# Patient Record
Sex: FEMALE | Race: OTHER | HISPANIC OR LATINO | Employment: FULL TIME | URBAN - METROPOLITAN AREA
[De-identification: names, ages, dates, MRNs, and addresses within clinical notes are randomized per-mention and may not be internally consistent; named-entity substitution may affect disease eponyms.]

---

## 2017-06-19 ENCOUNTER — ALLSCRIPTS OFFICE VISIT (OUTPATIENT)
Dept: OTHER | Facility: OTHER | Age: 27
End: 2017-06-19

## 2017-06-19 DIAGNOSIS — Z87.42 PERSONAL HISTORY OF OTHER DISEASES OF THE FEMALE GENITAL TRACT: ICD-10-CM

## 2017-06-19 DIAGNOSIS — N97.0 FEMALE INFERTILITY ASSOCIATED WITH ANOVULATION: ICD-10-CM

## 2017-06-19 DIAGNOSIS — N94.6 DYSMENORRHEA: ICD-10-CM

## 2017-06-26 ENCOUNTER — LAB CONVERSION - ENCOUNTER (OUTPATIENT)
Dept: OTHER | Facility: OTHER | Age: 27
End: 2017-06-26

## 2017-06-26 LAB
ADDITIONAL INFORMATION (HISTORICAL): NORMAL
ADEQUACY: (HISTORICAL): NORMAL
CLINICAL COMMENT (HISTORICAL): NORMAL
CYTOTECHNOLOGIST: (HISTORICAL): NORMAL
INFECTION (HISTORICAL): NORMAL
INTERPRETATION (HISTORICAL): NORMAL
LMP (HISTORICAL): NORMAL
PREV. BX: (HISTORICAL): NORMAL
PREV. PAP (HISTORICAL): NORMAL
SOURCE (HISTORICAL): NORMAL

## 2017-06-30 ENCOUNTER — HOSPITAL ENCOUNTER (OUTPATIENT)
Dept: ULTRASOUND IMAGING | Facility: HOSPITAL | Age: 27
Discharge: HOME | End: 2017-06-30
Attending: OBSTETRICS & GYNECOLOGY
Payer: COMMERCIAL

## 2017-06-30 DIAGNOSIS — N94.6 DYSMENORRHEA: ICD-10-CM

## 2017-06-30 PROCEDURE — 76830 TRANSVAGINAL US NON-OB: CPT

## 2017-06-30 PROCEDURE — 76856 US EXAM PELVIC COMPLETE: CPT

## 2017-07-11 ENCOUNTER — ALLSCRIPTS OFFICE VISIT (OUTPATIENT)
Dept: OTHER | Facility: OTHER | Age: 27
End: 2017-07-11

## 2017-07-11 ENCOUNTER — APPOINTMENT (OUTPATIENT)
Dept: LAB | Facility: HOSPITAL | Age: 27
End: 2017-07-11
Attending: OBSTETRICS & GYNECOLOGY
Payer: COMMERCIAL

## 2017-07-11 DIAGNOSIS — N97.0 FEMALE INFERTILITY ASSOCIATED WITH ANOVULATION: ICD-10-CM

## 2017-07-11 LAB — PROGEST SERPL-MCNC: 1.3 NG/ML

## 2017-07-11 PROCEDURE — 36415 COLL VENOUS BLD VENIPUNCTURE: CPT

## 2017-07-11 PROCEDURE — 84144 ASSAY OF PROGESTERONE: CPT

## 2017-07-26 ENCOUNTER — HOSPITAL ENCOUNTER (EMERGENCY)
Facility: HOSPITAL | Age: 27
Discharge: HOME | End: 2017-07-26
Attending: EMERGENCY MEDICINE
Payer: COMMERCIAL

## 2017-07-26 VITALS
HEART RATE: 61 BPM | TEMPERATURE: 98.7 F | OXYGEN SATURATION: 91 % | DIASTOLIC BLOOD PRESSURE: 85 MMHG | SYSTOLIC BLOOD PRESSURE: 121 MMHG | RESPIRATION RATE: 16 BRPM | WEIGHT: 151.01 LBS

## 2017-07-26 DIAGNOSIS — T78.40XA ALLERGIC REACTION, INITIAL ENCOUNTER: Primary | ICD-10-CM

## 2017-07-26 PROCEDURE — 99283 EMERGENCY DEPT VISIT LOW MDM: CPT

## 2017-07-26 RX ORDER — ALBUTEROL SULFATE 90 UG/1
2 AEROSOL, METERED RESPIRATORY (INHALATION) EVERY 4 HOURS PRN
COMMUNITY

## 2017-07-26 RX ORDER — PREDNISONE 20 MG/1
40 TABLET ORAL DAILY
Qty: 6 TABLET | Refills: 0 | Status: SHIPPED | OUTPATIENT
Start: 2017-07-26 | End: 2017-07-29

## 2017-07-26 RX ORDER — DIPHENHYDRAMINE HCL 25 MG
25 TABLET ORAL ONCE
Status: COMPLETED | OUTPATIENT
Start: 2017-07-26 | End: 2017-07-26

## 2017-07-26 RX ORDER — DIPHENHYDRAMINE HCL 25 MG
25 CAPSULE ORAL EVERY 6 HOURS PRN
Qty: 20 CAPSULE | Refills: 0 | Status: SHIPPED | OUTPATIENT
Start: 2017-07-26 | End: 2017-10-26

## 2017-07-26 RX ADMIN — DIPHENHYDRAMINE HCL 25 MG: 25 TABLET ORAL at 15:21

## 2017-07-26 RX ADMIN — DEXAMETHASONE SODIUM PHOSPHATE 10 MG: 10 INJECTION, SOLUTION INTRAMUSCULAR; INTRAVENOUS at 15:21

## 2017-08-14 ENCOUNTER — ALLSCRIPTS OFFICE VISIT (OUTPATIENT)
Dept: OTHER | Facility: OTHER | Age: 27
End: 2017-08-14

## 2017-08-14 DIAGNOSIS — N97.0 FEMALE INFERTILITY ASSOCIATED WITH ANOVULATION: ICD-10-CM

## 2017-10-04 ENCOUNTER — GENERIC CONVERSION - ENCOUNTER (OUTPATIENT)
Dept: OTHER | Facility: OTHER | Age: 27
End: 2017-10-04

## 2017-10-04 ENCOUNTER — APPOINTMENT (OUTPATIENT)
Dept: LAB | Facility: HOSPITAL | Age: 27
End: 2017-10-04
Attending: OBSTETRICS & GYNECOLOGY
Payer: COMMERCIAL

## 2017-10-04 DIAGNOSIS — N91.2 AMENORRHEA: ICD-10-CM

## 2017-10-04 DIAGNOSIS — R79.89 OTHER SPECIFIED ABNORMAL FINDINGS OF BLOOD CHEMISTRY: ICD-10-CM

## 2017-10-04 DIAGNOSIS — O20.0 THREATENED ABORTION: ICD-10-CM

## 2017-10-04 LAB
B-HCG SERPL-ACNC: 337 MIU/ML
PROGEST SERPL-MCNC: 11.7 NG/ML
TSH SERPL DL<=0.05 MIU/L-ACNC: 4.08 UIU/ML (ref 0.36–3.74)

## 2017-10-04 PROCEDURE — 36415 COLL VENOUS BLD VENIPUNCTURE: CPT

## 2017-10-04 PROCEDURE — 84443 ASSAY THYROID STIM HORMONE: CPT

## 2017-10-04 PROCEDURE — 84144 ASSAY OF PROGESTERONE: CPT

## 2017-10-04 PROCEDURE — 84702 CHORIONIC GONADOTROPIN TEST: CPT

## 2017-10-11 ENCOUNTER — APPOINTMENT (OUTPATIENT)
Dept: LAB | Facility: HOSPITAL | Age: 27
End: 2017-10-11
Attending: OBSTETRICS & GYNECOLOGY
Payer: COMMERCIAL

## 2017-10-11 ENCOUNTER — GENERIC CONVERSION - ENCOUNTER (OUTPATIENT)
Dept: OTHER | Facility: OTHER | Age: 27
End: 2017-10-11

## 2017-10-11 DIAGNOSIS — R79.89 OTHER SPECIFIED ABNORMAL FINDINGS OF BLOOD CHEMISTRY: ICD-10-CM

## 2017-10-11 DIAGNOSIS — N91.2 AMENORRHEA: ICD-10-CM

## 2017-10-11 LAB
B-HCG SERPL-ACNC: 710.1 MIU/ML
PROGEST SERPL-MCNC: 16.8 NG/ML

## 2017-10-11 PROCEDURE — 84702 CHORIONIC GONADOTROPIN TEST: CPT

## 2017-10-11 PROCEDURE — 84144 ASSAY OF PROGESTERONE: CPT

## 2017-10-11 PROCEDURE — 36415 COLL VENOUS BLD VENIPUNCTURE: CPT

## 2017-10-12 ENCOUNTER — HOSPITAL ENCOUNTER (OUTPATIENT)
Dept: ULTRASOUND IMAGING | Facility: HOSPITAL | Age: 27
Discharge: HOME | End: 2017-10-12
Attending: OBSTETRICS & GYNECOLOGY
Payer: COMMERCIAL

## 2017-10-12 ENCOUNTER — TRANSCRIBE ORDERS (OUTPATIENT)
Dept: ADMINISTRATIVE | Facility: HOSPITAL | Age: 27
End: 2017-10-12

## 2017-10-12 DIAGNOSIS — O00.90 ECTOPIC PREGNANCY, UNSPECIFIED LOCATION, UNSPECIFIED WHETHER INTRAUTERINE PREGNANCY PRESENT: ICD-10-CM

## 2017-10-12 DIAGNOSIS — O00.90 ECTOPIC PREGNANCY, UNSPECIFIED LOCATION, UNSPECIFIED WHETHER INTRAUTERINE PREGNANCY PRESENT: Primary | ICD-10-CM

## 2017-10-12 PROCEDURE — 76801 OB US < 14 WKS SINGLE FETUS: CPT

## 2017-10-16 ENCOUNTER — APPOINTMENT (OUTPATIENT)
Dept: LAB | Facility: HOSPITAL | Age: 27
End: 2017-10-16
Attending: OBSTETRICS & GYNECOLOGY
Payer: COMMERCIAL

## 2017-10-16 DIAGNOSIS — O20.0 THREATENED ABORTION: ICD-10-CM

## 2017-10-16 LAB
B-HCG SERPL-ACNC: 1098.4 MIU/ML
PROGEST SERPL-MCNC: 13.1 NG/ML

## 2017-10-16 PROCEDURE — 36415 COLL VENOUS BLD VENIPUNCTURE: CPT

## 2017-10-16 PROCEDURE — 84144 ASSAY OF PROGESTERONE: CPT

## 2017-10-16 PROCEDURE — 84702 CHORIONIC GONADOTROPIN TEST: CPT

## 2017-10-21 ENCOUNTER — HOSPITAL ENCOUNTER (OUTPATIENT)
Dept: ULTRASOUND IMAGING | Facility: HOSPITAL | Age: 27
Discharge: HOME | End: 2017-10-21
Attending: OBSTETRICS & GYNECOLOGY
Payer: COMMERCIAL

## 2017-10-21 DIAGNOSIS — N91.2 AMENORRHEA: ICD-10-CM

## 2017-10-21 PROCEDURE — 76816 OB US FOLLOW-UP PER FETUS: CPT

## 2017-10-24 ENCOUNTER — ALLSCRIPTS OFFICE VISIT (OUTPATIENT)
Dept: OTHER | Facility: OTHER | Age: 27
End: 2017-10-24

## 2017-10-26 ENCOUNTER — APPOINTMENT (EMERGENCY)
Dept: ULTRASOUND IMAGING | Facility: HOSPITAL | Age: 27
End: 2017-10-26
Payer: COMMERCIAL

## 2017-10-26 ENCOUNTER — HOSPITAL ENCOUNTER (EMERGENCY)
Facility: HOSPITAL | Age: 27
Discharge: HOME | End: 2017-10-26
Admitting: EMERGENCY MEDICINE
Payer: COMMERCIAL

## 2017-10-26 VITALS
DIASTOLIC BLOOD PRESSURE: 89 MMHG | SYSTOLIC BLOOD PRESSURE: 139 MMHG | WEIGHT: 151.9 LBS | TEMPERATURE: 97.9 F | RESPIRATION RATE: 16 BRPM | OXYGEN SATURATION: 98 % | HEART RATE: 111 BPM

## 2017-10-26 DIAGNOSIS — O02.89 NONVIABLE PREGNANCY: Primary | ICD-10-CM

## 2017-10-26 LAB
ABO GROUP BLD: NORMAL
ANION GAP SERPL CALCULATED.3IONS-SCNC: 8 MMOL/L (ref 4–13)
B-HCG SERPL-ACNC: 1439.3 MIU/ML
BACTERIA UR QL AUTO: ABNORMAL /HPF
BASOPHILS # BLD AUTO: 0.04 THOUSANDS/ΜL (ref 0–0.1)
BASOPHILS NFR BLD AUTO: 1 % (ref 0–1)
BILIRUB UR QL STRIP: NEGATIVE
BLD GP AB SCN SERPL QL: NEGATIVE
BUN SERPL-MCNC: 15 MG/DL (ref 5–25)
CALCIUM SERPL-MCNC: 9.4 MG/DL (ref 8.3–10.1)
CHLORIDE SERPL-SCNC: 104 MMOL/L (ref 100–108)
CLARITY UR: ABNORMAL
CO2 SERPL-SCNC: 27 MMOL/L (ref 21–32)
COLOR UR: YELLOW
CREAT SERPL-MCNC: 0.77 MG/DL (ref 0.6–1.3)
EOSINOPHIL # BLD AUTO: 0.1 THOUSAND/ΜL (ref 0–0.61)
EOSINOPHIL NFR BLD AUTO: 1 % (ref 0–6)
ERYTHROCYTE [DISTWIDTH] IN BLOOD BY AUTOMATED COUNT: 12.9 % (ref 11.6–15.1)
EXT PREG TEST URINE: NORMAL
GFR SERPL CREATININE-BSD FRML MDRD: 108 ML/MIN/1.73SQ M
GLUCOSE SERPL-MCNC: 91 MG/DL (ref 65–140)
GLUCOSE UR STRIP-MCNC: NEGATIVE MG/DL
HCT VFR BLD AUTO: 36.4 % (ref 34.8–46.1)
HGB BLD-MCNC: 12.7 G/DL (ref 11.5–15.4)
HGB UR QL STRIP.AUTO: ABNORMAL
KETONES UR STRIP-MCNC: NEGATIVE MG/DL
LEUKOCYTE ESTERASE UR QL STRIP: NEGATIVE
LYMPHOCYTES # BLD AUTO: 2.28 THOUSANDS/ΜL (ref 0.6–4.47)
LYMPHOCYTES NFR BLD AUTO: 29 % (ref 14–44)
MCH RBC QN AUTO: 31.7 PG (ref 26.8–34.3)
MCHC RBC AUTO-ENTMCNC: 34.9 G/DL (ref 31.4–37.4)
MCV RBC AUTO: 91 FL (ref 82–98)
MONOCYTES # BLD AUTO: 0.76 THOUSAND/ΜL (ref 0.17–1.22)
MONOCYTES NFR BLD AUTO: 10 % (ref 4–12)
NEUTROPHILS # BLD AUTO: 4.81 THOUSANDS/ΜL (ref 1.85–7.62)
NEUTS SEG NFR BLD AUTO: 59 % (ref 43–75)
NITRITE UR QL STRIP: NEGATIVE
NON-SQ EPI CELLS URNS QL MICRO: ABNORMAL /HPF
NRBC BLD AUTO-RTO: 0 /100 WBCS
PH UR STRIP.AUTO: 6 [PH] (ref 4.5–8)
PLATELET # BLD AUTO: 259 THOUSANDS/UL (ref 149–390)
PMV BLD AUTO: 9 FL (ref 8.9–12.7)
POTASSIUM SERPL-SCNC: 3.8 MMOL/L (ref 3.5–5.3)
PROGEST SERPL-MCNC: 13.9 NG/ML
PROT UR STRIP-MCNC: NEGATIVE MG/DL
RBC # BLD AUTO: 4.01 MILLION/UL (ref 3.81–5.12)
RBC #/AREA URNS AUTO: ABNORMAL /HPF
RH BLD: POSITIVE
SODIUM SERPL-SCNC: 139 MMOL/L (ref 136–145)
SP GR UR STRIP.AUTO: 1.02 (ref 1–1.03)
SPECIMEN EXPIRATION DATE: NORMAL
UROBILINOGEN UR QL STRIP.AUTO: 0.2 E.U./DL
WBC # BLD AUTO: 7.99 THOUSAND/UL (ref 4.31–10.16)
WBC #/AREA URNS AUTO: ABNORMAL /HPF

## 2017-10-26 PROCEDURE — 84702 CHORIONIC GONADOTROPIN TEST: CPT | Performed by: PHYSICIAN ASSISTANT

## 2017-10-26 PROCEDURE — 76816 OB US FOLLOW-UP PER FETUS: CPT

## 2017-10-26 PROCEDURE — 81002 URINALYSIS NONAUTO W/O SCOPE: CPT

## 2017-10-26 PROCEDURE — 86901 BLOOD TYPING SEROLOGIC RH(D): CPT | Performed by: PHYSICIAN ASSISTANT

## 2017-10-26 PROCEDURE — 86850 RBC ANTIBODY SCREEN: CPT | Performed by: PHYSICIAN ASSISTANT

## 2017-10-26 PROCEDURE — 86900 BLOOD TYPING SEROLOGIC ABO: CPT | Performed by: PHYSICIAN ASSISTANT

## 2017-10-26 PROCEDURE — 36415 COLL VENOUS BLD VENIPUNCTURE: CPT | Performed by: PHYSICIAN ASSISTANT

## 2017-10-26 PROCEDURE — 81025 URINE PREGNANCY TEST: CPT

## 2017-10-26 PROCEDURE — 85025 COMPLETE CBC W/AUTO DIFF WBC: CPT | Performed by: PHYSICIAN ASSISTANT

## 2017-10-26 PROCEDURE — 80048 BASIC METABOLIC PNL TOTAL CA: CPT | Performed by: PHYSICIAN ASSISTANT

## 2017-10-26 PROCEDURE — 76817 TRANSVAGINAL US OBSTETRIC: CPT

## 2017-10-26 PROCEDURE — 84144 ASSAY OF PROGESTERONE: CPT | Performed by: PHYSICIAN ASSISTANT

## 2017-10-26 PROCEDURE — 81001 URINALYSIS AUTO W/SCOPE: CPT

## 2017-10-26 PROCEDURE — 99284 EMERGENCY DEPT VISIT MOD MDM: CPT

## 2017-10-26 RX ORDER — LEVOTHYROXINE SODIUM 0.03 MG/1
25 TABLET ORAL DAILY
COMMUNITY
End: 2017-10-27 | Stop reason: HOSPADM

## 2017-10-26 NOTE — ED NOTES
Pt reports is approximately 6 weeks pregnant,   Pt reports last night developed lower back pain  Pt reports this morning went to the bathroom and noticed she had passed blood clots  Pt reports no active bleeding since then   Pt reports lower abdominal pain      Timoteo Veliz RN  10/26/17 1130

## 2017-10-26 NOTE — ED PROVIDER NOTES
History  Chief Complaint   Patient presents with    Vaginal Bleeding - Pregnant     c/o lower back pain and passing clots starting today  pt  reports she is approx  6 weeks pregnant  Denies abdominal pain  24year old female 6 weeks pregnant presents today shortly after passing 3 dime-sized blood clots in the toilet this morning  Has been following up for weekly HCGs as her numbers were not rising appropriately, and last US one week ago showed an IUP of uncertain viability  She was advised that she is likely to miscarry by Dr Alma Delia Briseno, her OB  She denies any abdominal or pelvic pain  No urinary symptoms  No active bleeding, just one episode earlier today  Pt is tearful and reports that she had been taking Clomid to get pregnant for the past few months  This is her first pregnancy  Prior to Admission Medications   Prescriptions Last Dose Informant Patient Reported? Taking? albuterol (PROVENTIL HFA,VENTOLIN HFA) 90 mcg/act inhaler   Yes Yes   Sig: Inhale 2 puffs every 4 (four) hours as needed      Facility-Administered Medications: None       Past Medical History:   Diagnosis Date    Asthma     Fibroids        Past Surgical History:   Procedure Laterality Date    DILATION AND EVACUATION N/A 10/27/2017    Procedure: DILATATION AND EVACUATION (D&E); Surgeon: Alicia Dugan MD;  Location: [facility] OR;  Service: Gynecology       History reviewed  No pertinent family history  I have reviewed and agree with the history as documented  Social History   Substance Use Topics    Smoking status: Former Smoker    Smokeless tobacco: Never Used    Alcohol use No        Review of Systems   Genitourinary: Positive for vaginal bleeding  All other systems reviewed and are negative        Physical Exam  ED Triage Vitals   Temperature Pulse Respirations Blood Pressure SpO2   10/26/17 1015 10/26/17 1015 10/26/17 1015 10/26/17 1015 10/26/17 1015   99 2 °F (37 3 °C) 92 18 150/93 98 %      Temp Source Heart Rate Source Patient Position - Orthostatic VS BP Location FiO2 (%)   10/26/17 1015 10/26/17 1204 10/26/17 1204 10/26/17 1204 --   Temporal Monitor Sitting Right arm       Pain Score       10/26/17 1015       No Pain           Orthostatic Vital Signs  Vitals:    10/26/17 1015 10/26/17 1204 10/26/17 1347   BP: 150/93 119/74 139/89   Pulse: 92 65 (!) 111   Patient Position - Orthostatic VS:  Sitting Sitting       Physical Exam   Constitutional: She is oriented to person, place, and time  She appears well-developed and well-nourished  No distress  HENT:   Head: Normocephalic and atraumatic  Eyes: Conjunctivae and EOM are normal  Pupils are equal, round, and reactive to light  Neck: Normal range of motion  Neck supple  Cardiovascular: Normal rate and regular rhythm  Pulmonary/Chest: Effort normal and breath sounds normal  No respiratory distress  She has no wheezes  Abdominal: Soft  Bowel sounds are normal  She exhibits no distension  There is no tenderness  Genitourinary: Vagina normal  Cervix exhibits no discharge  No bleeding in the vagina  Genitourinary Comments: Cervical os closed without any bleeding, discharge  Musculoskeletal: Normal range of motion  Neurological: She is alert and oriented to person, place, and time  Skin: Skin is warm and dry  Capillary refill takes less than 2 seconds  She is not diaphoretic  Psychiatric: She has a normal mood and affect   Her behavior is normal        ED Medications  Medications - No data to display    Diagnostic Studies  Results Reviewed     Procedure Component Value Units Date/Time    Progesterone [[id number]]  (Normal) Collected:  10/26/17 1156    Lab Status:  Final result Specimen:  Blood from Arm, Right Updated:  10/26/17 1533     Progesterone 13 90 ng/mL     hCG, quantitative [[id number]]  (Abnormal) Collected:  10/26/17 1156    Lab Status:  Final result Specimen:  Blood from Arm, Right Updated:  10/26/17 1240     HCG, Quant 1,439 3 (H) mIU/mL Narrative:          Expected Ranges:     Approximate               Approximate HCG  Gestation age          Concentration ( mIU/mL)  _____________          ______________________   Mavis Irby                      HCG values  0 2-1                       5-50  1-2                           2-3                         100-5000  3-4                         500-69244  4-5                         1000-27951  5-6                         62884-254243  6-8                         25725-209838  8-12                        61998-079958    Basic metabolic panel [[id number]] Collected:  10/26/17 1156    Lab Status:  Final result Specimen:  Blood from Arm, Right Updated:  10/26/17 1240     Sodium 139 mmol/L      Potassium 3 8 mmol/L      Chloride 104 mmol/L      CO2 27 mmol/L      Anion Gap 8 mmol/L      BUN 15 mg/dL      Creatinine 0 77 mg/dL      Glucose 91 mg/dL      Calcium 9 4 mg/dL      eGFR 108 ml/min/1 73sq m     Narrative:         National Kidney Disease Education Program recommendations are as follows:  GFR calculation is accurate only with a steady state creatinine  Chronic Kidney disease less than 60 ml/min/1 73 sq  meters  Kidney failure less than 15 ml/min/1 73 sq  meters      CBC and differential [[id number]]  (Normal) Collected:  10/26/17 1156    Lab Status:  Final result Specimen:  Blood from Arm, Right Updated:  10/26/17 1203     WBC 7 99 Thousand/uL      RBC 4 01 Million/uL      Hemoglobin 12 7 g/dL      Hematocrit 36 4 %      MCV 91 fL      MCH 31 7 pg      MCHC 34 9 g/dL      RDW 12 9 %      MPV 9 0 fL      Platelets 416 Thousands/uL      nRBC 0 /100 WBCs      Neutrophils Relative 59 %      Lymphocytes Relative 29 %      Monocytes Relative 10 %      Eosinophils Relative 1 %      Basophils Relative 1 %      Neutrophils Absolute 4 81 Thousands/µL      Lymphocytes Absolute 2 28 Thousands/µL      Monocytes Absolute 0 76 Thousand/µL      Eosinophils Absolute 0 10 Thousand/µL      Basophils Absolute 0 04 Thousands/µL Urine Microscopic [[id number]]  (Abnormal) Collected:  10/26/17 1125    Lab Status:  Final result Specimen:  Urine from Urine, Clean Catch Updated:  10/26/17 1128     RBC, UA None Seen /hpf      WBC, UA 1-2 (A) /hpf      Epithelial Cells Moderate (A) /hpf      Bacteria, UA Moderate (A) /hpf     POCT urinalysis dipstick [[id number]]  (Abnormal) Resulted:  10/26/17 1113    Lab Status:  Final result Specimen:  Urine from Urine, Other Updated:  10/26/17 1113    POCT pregnancy, urine [[id number]]  (Normal) Resulted:  10/26/17 1113    Lab Status:  Final result Updated:  10/26/17 1113     EXT PREG TEST UR (Ref: Negative) positive(+)    ED Urine Macroscopic [[id number]]  (Abnormal) Collected:  10/26/17 1125    Lab Status:  Final result Specimen:  Urine Updated:  10/26/17 1111     Color, UA Yellow     Clarity, UA Turbid     pH, UA 6 0     Leukocytes, UA Negative     Nitrite, UA Negative     Protein, UA Negative mg/dl      Glucose, UA Negative mg/dl      Ketones, UA Negative mg/dl      Urobilinogen, UA 0 2 E U /dl      Bilirubin, UA Negative     Blood, UA Trace (A)     Specific [place], UA 1 025    Narrative:       CLINITEK RESULT                 US OB transvaginal   Final Result by Claudia Beltran MD (10/27 4653)      Intrauterine gestation without heartbeat, not significantly changed in size and appearance from the prior study  Based on current consensus literature terminology, this is a nonviable intrauterine pregnancy  There is absence of embryo with heartbeat 2    weeks after a scan that showed gestational sac with a yolk sac on 10/12/2017  Reference: N Engl J Med 124;70 pp  3495 to 1451  ##imslh##imslh         Workstation performed: [id number]         US OB follow up transabdominal approach   Final Result by Claudia Beltran MD (10/26 5401)      Intrauterine gestation without heartbeat, not significantly changed in size and appearance from the prior study   Based on current consensus literature terminology, this is a nonviable intrauterine pregnancy  There is absence of embryo with heartbeat 2    weeks after a scan that showed gestational sac with a yolk sac on 10/12/2017  Reference: N Engl J Med 713;58 pp  9719 to 1451  ##imslh##imslh         Workstation performed: [id number]                    Procedures  Procedures       Phone Contacts  ED Phone Contact    ED Course  ED Course                                MDM  Number of Diagnoses or Management Options  Nonviable pregnancy:   Diagnosis management comments: Case discussed with Dr Silvana Beal who recommends US to confirm viability  When US revealed a nonviable IUP, Dr Silvana Beal was contacted and she saw the patient  The patient will follow-up with her tomorrow in the office  [facility] Time    Disposition  Final diagnoses:   Nonviable pregnancy     Time reflects when diagnosis was documented in both MDM as applicable and the Disposition within this note     Time User Action Codes Description Comment    10/26/2017  1:55 PM Lisa Cough Add [O02 89] Nonviable pregnancy       ED Disposition     ED Disposition Condition Comment    Discharge  Bethko Crisfide discharge to home/self care      Condition at discharge: Good        Follow-up Information     Follow up With Specialties Details Why Keri Alexander MD Obstetrics and Gynecology Schedule an appointment as soon as possible for a visit  [address] [place] [address],[address]  [facility] 9          Discharge Medication List as of 10/26/2017  1:57 PM      CONTINUE these medications which have NOT CHANGED    Details   albuterol (PROVENTIL HFA,VENTOLIN HFA) 90 mcg/act inhaler Inhale 2 puffs every 4 (four) hours as needed, Historical Med      levothyroxine 25 mcg tablet Take 25 mcg by mouth daily, Historical Med      Prenatal MV-Min-Fe Fum-FA-DHA (PRENATAL 1 PO) Take by mouth, Historical Med      Progesterone Micronized (PROGESTERONE PO) Take by mouth, Historical Med           No discharge procedures on file      ED Provider  Electronically Signed by           Liza Dc PA-C  11/05/17 8226

## 2017-10-26 NOTE — PROGRESS NOTES
Patient currently has no bleeding  She also denies pelvic pain  Her ultrasound results were consistent for an embryonic demise and missed   Patient is very upset  I discussed with her management options including expectant management versus medical therapy versus surgical therapy with a suction curettaged  She would like expectant management for now  She knows to call if with heavy bleeding or severe pain  For now she would like to keep her appointment with me in the office on Monday for further discussion regarding treatment options

## 2017-10-27 ENCOUNTER — HOSPITAL ENCOUNTER (OUTPATIENT)
Facility: HOSPITAL | Age: 27
Setting detail: OUTPATIENT SURGERY
Discharge: HOME | End: 2017-10-27
Attending: OBSTETRICS & GYNECOLOGY | Admitting: OBSTETRICS & GYNECOLOGY
Payer: COMMERCIAL

## 2017-10-27 ENCOUNTER — GENERIC CONVERSION - ENCOUNTER (OUTPATIENT)
Dept: OTHER | Facility: OTHER | Age: 27
End: 2017-10-27

## 2017-10-27 ENCOUNTER — ANESTHESIA (OUTPATIENT)
Dept: PERIOP | Facility: HOSPITAL | Age: 27
End: 2017-10-27
Payer: COMMERCIAL

## 2017-10-27 ENCOUNTER — ANESTHESIA EVENT (OUTPATIENT)
Dept: PERIOP | Facility: HOSPITAL | Age: 27
End: 2017-10-27
Payer: COMMERCIAL

## 2017-10-27 VITALS
RESPIRATION RATE: 16 BRPM | WEIGHT: 149 LBS | HEART RATE: 83 BPM | BODY MASS INDEX: 26.4 KG/M2 | SYSTOLIC BLOOD PRESSURE: 123 MMHG | TEMPERATURE: 97.5 F | OXYGEN SATURATION: 100 % | DIASTOLIC BLOOD PRESSURE: 60 MMHG | HEIGHT: 63 IN

## 2017-10-27 PROCEDURE — 88305 TISSUE EXAM BY PATHOLOGIST: CPT | Performed by: OBSTETRICS & GYNECOLOGY

## 2017-10-27 RX ORDER — METHYLERGONOVINE MALEATE 0.2 MG/ML
INJECTION INTRAVENOUS AS NEEDED
Status: DISCONTINUED | OUTPATIENT
Start: 2017-10-27 | End: 2017-10-27 | Stop reason: SURG

## 2017-10-27 RX ORDER — LIDOCAINE HYDROCHLORIDE 10 MG/ML
INJECTION, SOLUTION INFILTRATION; PERINEURAL AS NEEDED
Status: DISCONTINUED | OUTPATIENT
Start: 2017-10-27 | End: 2017-10-27 | Stop reason: SURG

## 2017-10-27 RX ORDER — OXYCODONE HYDROCHLORIDE AND ACETAMINOPHEN 5; 325 MG/1; MG/1
1 TABLET ORAL EVERY 4 HOURS PRN
Status: DISCONTINUED | OUTPATIENT
Start: 2017-10-27 | End: 2017-10-27 | Stop reason: HOSPADM

## 2017-10-27 RX ORDER — DOXYCYCLINE HYCLATE 100 MG/1
200 CAPSULE ORAL ONCE
Status: DISCONTINUED | OUTPATIENT
Start: 2017-10-27 | End: 2017-10-27 | Stop reason: SDUPTHER

## 2017-10-27 RX ORDER — SODIUM CHLORIDE, SODIUM LACTATE, POTASSIUM CHLORIDE, CALCIUM CHLORIDE 600; 310; 30; 20 MG/100ML; MG/100ML; MG/100ML; MG/100ML
125 INJECTION, SOLUTION INTRAVENOUS CONTINUOUS
Status: DISCONTINUED | OUTPATIENT
Start: 2017-10-27 | End: 2017-10-27 | Stop reason: HOSPADM

## 2017-10-27 RX ORDER — METOCLOPRAMIDE HYDROCHLORIDE 5 MG/ML
10 INJECTION INTRAMUSCULAR; INTRAVENOUS ONCE
Status: COMPLETED | OUTPATIENT
Start: 2017-10-27 | End: 2017-10-27

## 2017-10-27 RX ORDER — FENTANYL CITRATE 50 UG/ML
INJECTION, SOLUTION INTRAMUSCULAR; INTRAVENOUS AS NEEDED
Status: DISCONTINUED | OUTPATIENT
Start: 2017-10-27 | End: 2017-10-27 | Stop reason: SURG

## 2017-10-27 RX ORDER — MIDAZOLAM HYDROCHLORIDE 1 MG/ML
INJECTION INTRAMUSCULAR; INTRAVENOUS AS NEEDED
Status: DISCONTINUED | OUTPATIENT
Start: 2017-10-27 | End: 2017-10-27 | Stop reason: SURG

## 2017-10-27 RX ORDER — KETOROLAC TROMETHAMINE 30 MG/ML
30 INJECTION, SOLUTION INTRAMUSCULAR; INTRAVENOUS ONCE
Status: COMPLETED | OUTPATIENT
Start: 2017-10-27 | End: 2017-10-27

## 2017-10-27 RX ORDER — ONDANSETRON 2 MG/ML
4 INJECTION INTRAMUSCULAR; INTRAVENOUS ONCE AS NEEDED
Status: DISCONTINUED | OUTPATIENT
Start: 2017-10-27 | End: 2017-10-27 | Stop reason: HOSPADM

## 2017-10-27 RX ORDER — SODIUM CHLORIDE 9 MG/ML
125 INJECTION, SOLUTION INTRAVENOUS CONTINUOUS
Status: DISCONTINUED | OUTPATIENT
Start: 2017-10-27 | End: 2017-10-27 | Stop reason: HOSPADM

## 2017-10-27 RX ORDER — DOXYCYCLINE HYCLATE 100 MG/1
100 CAPSULE ORAL ONCE
Status: COMPLETED | OUTPATIENT
Start: 2017-10-27 | End: 2017-10-27

## 2017-10-27 RX ORDER — OXYCODONE HYDROCHLORIDE AND ACETAMINOPHEN 5; 325 MG/1; MG/1
2 TABLET ORAL EVERY 4 HOURS PRN
Status: DISCONTINUED | OUTPATIENT
Start: 2017-10-27 | End: 2017-10-27 | Stop reason: HOSPADM

## 2017-10-27 RX ORDER — DOXYCYCLINE HYCLATE 100 MG/1
200 CAPSULE ORAL ONCE
Status: COMPLETED | OUTPATIENT
Start: 2017-10-27 | End: 2017-10-27

## 2017-10-27 RX ORDER — ONDANSETRON 2 MG/ML
INJECTION INTRAMUSCULAR; INTRAVENOUS AS NEEDED
Status: DISCONTINUED | OUTPATIENT
Start: 2017-10-27 | End: 2017-10-27 | Stop reason: SURG

## 2017-10-27 RX ORDER — FENTANYL CITRATE/PF 50 MCG/ML
25 SYRINGE (ML) INJECTION
Status: DISCONTINUED | OUTPATIENT
Start: 2017-10-27 | End: 2017-10-27 | Stop reason: HOSPADM

## 2017-10-27 RX ORDER — IBUPROFEN 600 MG/1
600 TABLET ORAL EVERY 6 HOURS PRN
Status: DISCONTINUED | OUTPATIENT
Start: 2017-10-27 | End: 2017-10-27 | Stop reason: HOSPADM

## 2017-10-27 RX ORDER — MAGNESIUM HYDROXIDE 1200 MG/15ML
LIQUID ORAL AS NEEDED
Status: DISCONTINUED | OUTPATIENT
Start: 2017-10-27 | End: 2017-10-27 | Stop reason: HOSPADM

## 2017-10-27 RX ORDER — PROPOFOL 10 MG/ML
INJECTION, EMULSION INTRAVENOUS AS NEEDED
Status: DISCONTINUED | OUTPATIENT
Start: 2017-10-27 | End: 2017-10-27 | Stop reason: SURG

## 2017-10-27 RX ADMIN — DOXYCYCLINE 100 MG: 100 CAPSULE ORAL at 12:18

## 2017-10-27 RX ADMIN — METOCLOPRAMIDE HYDROCHLORIDE 10 MG: 5 INJECTION INTRAMUSCULAR; INTRAVENOUS at 12:25

## 2017-10-27 RX ADMIN — FENTANYL CITRATE 25 MCG: 50 INJECTION INTRAMUSCULAR; INTRAVENOUS at 14:25

## 2017-10-27 RX ADMIN — FENTANYL CITRATE 25 MCG: 50 INJECTION INTRAMUSCULAR; INTRAVENOUS at 14:18

## 2017-10-27 RX ADMIN — DEXAMETHASONE SODIUM PHOSPHATE 4 MG: 10 INJECTION INTRAMUSCULAR; INTRAVENOUS at 13:53

## 2017-10-27 RX ADMIN — SODIUM CHLORIDE 125 ML/HR: 0.9 INJECTION, SOLUTION INTRAVENOUS at 12:16

## 2017-10-27 RX ADMIN — FENTANYL CITRATE 50 MCG: 50 INJECTION, SOLUTION INTRAMUSCULAR; INTRAVENOUS at 13:38

## 2017-10-27 RX ADMIN — METHYLERGONOVINE MALEATE 0.2 MG: 0.2 INJECTION, SOLUTION INTRAMUSCULAR; INTRAVENOUS at 13:55

## 2017-10-27 RX ADMIN — ONDANSETRON HYDROCHLORIDE 4 MG: 2 INJECTION, SOLUTION INTRAVENOUS at 13:53

## 2017-10-27 RX ADMIN — FENTANYL CITRATE 50 MCG: 50 INJECTION, SOLUTION INTRAMUSCULAR; INTRAVENOUS at 13:35

## 2017-10-27 RX ADMIN — MIDAZOLAM HYDROCHLORIDE 4 MG: 1 INJECTION, SOLUTION INTRAMUSCULAR; INTRAVENOUS at 13:32

## 2017-10-27 RX ADMIN — PROPOFOL 200 MG: 10 INJECTION, EMULSION INTRAVENOUS at 13:35

## 2017-10-27 RX ADMIN — LIDOCAINE HYDROCHLORIDE 80 MG: 10 INJECTION, SOLUTION INFILTRATION; PERINEURAL at 13:35

## 2017-10-27 RX ADMIN — DOXYCYCLINE 200 MG: 100 CAPSULE ORAL at 15:36

## 2017-10-27 RX ADMIN — KETOROLAC TROMETHAMINE 30 MG: 30 INJECTION, SOLUTION INTRAMUSCULAR at 14:24

## 2017-10-27 RX ADMIN — SODIUM CHLORIDE: 0.9 INJECTION, SOLUTION INTRAVENOUS at 13:50

## 2017-10-27 RX ADMIN — IBUPROFEN 600 MG: 600 TABLET, FILM COATED ORAL at 15:34

## 2017-10-27 NOTE — PROGRESS NOTES
Dr Lucia Johnson at pt bedside, pt states nauseated-vomited approx 20cc yellow bile onto floor,tearful, nausea resolved,denies need for antiemetic

## 2017-10-27 NOTE — OP NOTE
OPERATIVE REPORT  PATIENT NAME: Vicente Wilson    :  1990  MRN: [id number]  Pt Location: [facility] OR ROOM 01    SURGERY DATE: 10/27/2017    Surgeon(s) and Role:     * Uriel Michelle MD - Primary    Preop Diagnosis:  Missed     Post-op Diagnosis:  Same    Procedure(s) (LRB):  DILATATION AND EVACUATION (D&E) (N/A)  Suction curettage    Specimen(s):  ID Type Source Tests Collected by Time Destination   1 : products of conception Tissue Products of Conception TISSUE EXAM Uriel Michelle MD 10/27/2017 1353        Estimated Blood Loss:   Minimal    Drains:  None     Anesthesia Type:   General    Operative Indications:  Nonviable pregnancy, 6 weeks by crown-rump length  Missed     Operative Findings:  Uterus 8-9 week in size  Anteverted uterus  Non dilated cervix  Gestational sac seen pre-operatively   Empty uterus post-procedure on US    Complications:   None    Procedure and Technique:  The patient was correctly identified  Pt was then taken to the OR where general anesthesia was obtained without difficulty   She received 100 mg po of Doxycyline pre-operatively  She was placed in the dorsal lithotomy position and was prepped and draped in a sterile fashion      The bladder was emptied with a straight catheter  A pre-operative US was performed  The suction D and C was performed under ultrasound guidance  The pt was examined under anesthesia and was found to have an anteverted uterus enlarged to about 8-9 wks with normal adnexa   A weighted speculum  was inserted into the vagina  The cervix was non-dilated with no products of conception at cervical os   The anterior lip of the cervix was the grasped with a single-tooth tenaculum       The uterus was gently sounded to 9 cm   Using Hanks dilators, the cervix was progressively dilated to about 27 Hanks       An 8 mm curved suction curette was advanced gently to the uterine fundus   The suction device was activated and the curette rotated to clear the uterus of the products of conception  Ultrasound demonstrated a uterus that was empty with a thin endometrial lining  The specimen was sent for routine pathology  A sharp curettage was then performed until a gritty texture noted  The suction curette was then reintroduced to clear uterus of all remaining products of conception  There was minimal bleeding noted   The tenaculum was removed  There was no bleeding from tenaculum site  Pt was given methergine to control uterine bleeding post-procedure  The speculum was then removed   Sponge, and instrument counts were correct times two   The pt tolerated the procedure well   She was taken to the recovery room in stable condition       I was present for the entire procedure and A qualified resident physician was not available       I was present for the entire procedure and A qualified resident physician was not available    Patient Disposition:  PACU  and hemodynamically stable    SIGNATURE: Devin Reyes MD  DATE: October 27, 2017  TIME: 1:57 PM

## 2017-10-27 NOTE — ANESTHESIA PREPROCEDURE EVALUATION
Review of Systems/Medical History  Patient summary reviewed        Cardiovascular  Negative cardio ROS    Pulmonary  Negative pulmonary ROS Asthma: well controlled/ stable , ,        GI/Hepatic  Negative GI/hepatic ROS          Negative  ROS        Endo/Other  Negative endo/other ROS History of thyroid disease , hypothyroidism,      GYN  Negative gynecology ROS          Hematology  Negative hematology ROS      Musculoskeletal  Negative musculoskeletal ROS        Neurology  Negative neurology ROS      Psychology   Negative psychology ROS            Physical Exam    Airway    Mallampati score: II  TM Distance: >3 FB  Neck ROM: full     Dental   No notable dental hx     Cardiovascular  Comment: Negative ROS, Rhythm: regular, Rate: normal, Cardiovascular exam normal    Pulmonary  Pulmonary exam normal Breath sounds clear to auscultation,     Other Findings        Anesthesia Plan  ASA Score- 2       Anesthesia Type- general and IV sedation with anesthesia with ASA Monitors  Additional Monitors:   Airway Plan:           Induction- intravenous  Informed Consent- Anesthetic plan and risks discussed with patient

## 2017-10-27 NOTE — DISCHARGE INSTRUCTIONS
Dilation and Curettage   WHAT YOU SHOULD KNOW:   Dilation and curettage (D and C) is a procedure to remove tissue from the lining of your uterus  INSTRUCTIONS:   Medicines:   · Pain medicine: You may be given medicine to take away or decrease pain  Do not wait until the pain is severe before you take your medicine  · Antibiotics: This medicine will help fight or prevent an infection  · Take your medicine as directed  Call your healthcare provider if you think your medicine is not helping or if you have side effects  Tell him if you are allergic to any medicine  Keep a list of the medicines, vitamins, and herbs you take  Include the amounts, and when and why you take them  Bring the list or the pill bottles to follow-up visits  Carry your medicine list with you in case of an emergency  Follow up with your healthcare provider as directed:  Write down your questions so you remember to ask them during your visits  Activity:  Ask your primary healthcare provider when you can return to your normal activities  Contact your primary healthcare provider if:   · You have foul-smelling vaginal discharge  · You do not get your monthly period  · You feel depressed or anxious  · You feel very tired and weak  · You have questions or concerns about your condition or care  Return to the emergency department if:   · You have heavy vaginal bleeding that soaks 1 pad in 1 hour for 2 hours in a row  · You have a fever and abdominal cramps  · Your pain does not get better, even after treatment  © 2014 [employer] is for End User's use only and may not be sold, redistributed or otherwise used for commercial purposes  All illustrations and images included in CareNotes® are the copyrighted property of A D A M , Inc  or Mark Mckeon  The above information is an  only  It is not intended as medical advice for individual conditions or treatments   Talk to your doctor, nurse or pharmacist before following any medical regimen to see if it is safe and effective for you

## 2017-10-27 NOTE — ANESTHESIA POSTPROCEDURE EVALUATION
Post-Op Assessment Note      CV Status:  Stable    Mental Status:  Alert and awake    Hydration Status:  Euvolemic    PONV Controlled:  Controlled    Airway Patency:  Patent    Post Op Vitals Reviewed: Yes          Staff: Anesthesiologist           /91 (10/27/17 1407)    Temp 97 8 °F (36 6 °C) (10/27/17 1407)    Pulse 100 (10/27/17 1407)   Resp 17 (10/27/17 1407)    SpO2 100 % (10/27/17 1407)

## 2017-11-10 ENCOUNTER — GENERIC CONVERSION - ENCOUNTER (OUTPATIENT)
Dept: OTHER | Facility: OTHER | Age: 27
End: 2017-11-10

## 2017-11-14 ENCOUNTER — GENERIC CONVERSION - ENCOUNTER (OUTPATIENT)
Dept: OTHER | Facility: OTHER | Age: 27
End: 2017-11-14

## 2018-01-10 NOTE — MISCELLANEOUS
Message  Return to work or school:   Haleigh Andersen is under my professional care  She was seen in my office on 10/27/17   She is able to return to work on  11/06/17       Dr Daisy Larkin        Signatures   Electronically signed by : Iwona Soto, ; Oct 30 2017  9:52AM EST                       (Author)

## 2018-01-11 NOTE — MISCELLANEOUS
Message  I spoke with patient regarding ultrasound results  Ultrasound shows a crown-rump length of 6 weeks  It is measuring 4 mm with no fetal cardiac activity  Based on that of the current guidelines, we cannot diagnosis failed pregnancy until the crown-rump length is at least 7 mm  We will check 1 more ultrasound this weekend to rule out missed  versus a viable intrauterine pregnancy  At this point, patient is having back pain but has no bleeding  She was asked also obtain an HCG quant and progesterone at the end of this week including a type and screen  She is to call if with any heavy bleeding or severe pain  She will follow up next week on Monday to discuss her results  Plan  Threatened     · (1) HCG QUANT; Status:Active; Requested SBK:[id number];    · (1) PROGESTERONE; Status:Active; Requested OPW:[id number];    · (1) TYPE & SCREEN; Status:Active;  Requested BQN:[id number];   currently receiving a biologic? : No  the patient pregnant or have they been in the last 90 days? : Yes  the patient been transfused in the last 3 months? : No  or PreOp : Medical   · US OB < 14 WEEKS SINGLE OR FIRST DESTINATION LEVEL 1; Status:Hold For -  Scheduling; Requested EAQ:[id number];     Signatures   Electronically signed by : VANESSA Weller ; Oct 23 2017  1:09PM EST                       (Author)

## 2018-01-13 VITALS
BODY MASS INDEX: 25.18 KG/M2 | DIASTOLIC BLOOD PRESSURE: 68 MMHG | SYSTOLIC BLOOD PRESSURE: 110 MMHG | HEIGHT: 63 IN | WEIGHT: 142.13 LBS

## 2018-01-13 VITALS
SYSTOLIC BLOOD PRESSURE: 112 MMHG | WEIGHT: 149.13 LBS | BODY MASS INDEX: 26.42 KG/M2 | HEIGHT: 63 IN | DIASTOLIC BLOOD PRESSURE: 82 MMHG

## 2018-01-14 VITALS
SYSTOLIC BLOOD PRESSURE: 112 MMHG | HEIGHT: 63 IN | DIASTOLIC BLOOD PRESSURE: 80 MMHG | WEIGHT: 146 LBS | BODY MASS INDEX: 25.87 KG/M2

## 2018-01-18 NOTE — MISCELLANEOUS
Message   Recorded as Task   Date: 10/11/2017 01:19 PM, Created By: Dedrick Rodriguez   Task Name: Medical Complaint Callback   Assigned To: [facility]   Regarding Patient: Dale Cheadle, Status: Active   CommentGeorge Ernesto - 11 Oct 2017 1:19 PM     TASK CREATED  patient called states have intercourse this morning and now she is spotting and cramping, pt want to know if thats normalZachermaya Bj - 11 Oct 2017 2:51 PM     TASK REPLIED TO: Previously Assigned To [address]                      pt needs to have bloodwork today and she needs to come back to the office on Friday  Dedrick Rodriguez - 11 Oct 2017 2:53 PM     TASK REASSIGNED: Previously Assigned To Santos Santana - 65 Oct 2017 2:55 PM     TASK EDITED  Per Dr Herve Davenport patient is to go today for lab work and be seen in the office on 10/13/2017  Spoke to patient and told her: she stated she was working and would go when she could for the labs  The appointment on Friday she would not accept  I tried to express the importance of having this follow up per Dr Herve Davenport as she was calling with a problem  Patient proceeded to hang up on me  Dr Herve Davenport aware        Active Problems    1  Amenorrhea (626 0) (N91 2)   2  Anovulation (628 0) (N97 0)   3  Candida vaginitis (112 1) (B37 3)   4  Dysmenorrhea (625 3) (N94 6)   5  Encounter for gynecological examination with Papanicolaou smear of cervix (V72 31)   (Z01 419)   6  History of PID (V13 29) (Z87 42)   7  Hypothyroidism (244 9) (E03 9)   8  Low serum progesterone (790 6) (R79 89)   9  Pelvic pain in pregnancy (646 80,625 9) (O26 899,R10 2)   10  Pregnancy confirmed by positive urine test (V72 42) (Z32 01)   11  Screening for STD (sexually transmitted disease) (V74 5) (Z11 3)    Current Meds   1  ClomiPHENE Citrate 50 MG Oral Tablet; TAKE 1 TABLET Daily In The Morning on Days-5   to 9 of cycle;    Therapy: 28VMJ6273 to (Evaluate:68Cqg9640)  Requested for: 39XDF5834; Last   Rx:[id number] Ordered   2  Fluconazole 150 MG Oral Tablet; TAKE 1 TABLET NOW, AND REPEAT IN 4 DAYS; Therapy: 99OYK8071 to (Evaluate:29Jun2017)  Requested for: 01UWC2561; Last   VW:[id number] Ordered   3  Levothyroxine Sodium 25 MCG Oral Tablet; TAKE 1 TABLET DAILY AS DIRECTED; Therapy: 93OQI4252 to (Sai Guerrero)  Requested for: 52MMN8825; Last   Rx:05Oct2017 Ordered   4  PreNatal  MG Oral Capsule; TAKE 1 TABLET PO QDAY; Therapy: 79HGU1696 to (Last Rx:04Oct2017)  Requested for: 86PIP0889 Ordered   5  Progesterone Micronized 100 MG Oral Capsule; Take one tablet po daily; Therapy: 86OAT3694 to (Last Rx:05Oct2017)  Requested for: 05Oct2017 Ordered    Allergies    1   No Known Drug Allergies    Signatures   Electronically signed by : VANESSA Collins ; Oct 11 2017  2:56PM EST                       (Author)

## 2018-01-22 VITALS
WEIGHT: 149 LBS | HEIGHT: 63 IN | SYSTOLIC BLOOD PRESSURE: 122 MMHG | DIASTOLIC BLOOD PRESSURE: 85 MMHG | BODY MASS INDEX: 26.4 KG/M2

## 2018-01-22 VITALS
HEIGHT: 63 IN | WEIGHT: 145.38 LBS | SYSTOLIC BLOOD PRESSURE: 130 MMHG | BODY MASS INDEX: 25.76 KG/M2 | DIASTOLIC BLOOD PRESSURE: 78 MMHG

## 2018-02-13 NOTE — MISCELLANEOUS
Provider Comments  Provider Comments:   PT NO SHOW 11/10/2017      Signatures   Electronically signed by :  Elke Joe, ; Nov 10 2017 12:20PM EST                       (Author)

## 2018-03-20 LAB — PREGNANCY TEST URINE (HISTORICAL): NEGATIVE

## 2018-07-03 ENCOUNTER — HOSPITAL ENCOUNTER (EMERGENCY)
Facility: HOSPITAL | Age: 28
Discharge: HOME | End: 2018-07-03
Attending: EMERGENCY MEDICINE
Payer: COMMERCIAL

## 2018-07-03 VITALS
HEART RATE: 80 BPM | BODY MASS INDEX: 23.56 KG/M2 | TEMPERATURE: 98.2 F | RESPIRATION RATE: 16 BRPM | WEIGHT: 133 LBS | DIASTOLIC BLOOD PRESSURE: 82 MMHG | OXYGEN SATURATION: 100 % | SYSTOLIC BLOOD PRESSURE: 135 MMHG

## 2018-07-03 DIAGNOSIS — K29.70 GASTRITIS: Primary | ICD-10-CM

## 2018-07-03 LAB
ALBUMIN SERPL BCP-MCNC: 3.8 G/DL (ref 3.5–5)
ALP SERPL-CCNC: 48 U/L (ref 46–116)
ALT SERPL W P-5'-P-CCNC: 17 U/L (ref 12–78)
ANION GAP SERPL CALCULATED.3IONS-SCNC: 7 MMOL/L (ref 4–13)
APTT PPP: 28 SECONDS (ref 24–36)
AST SERPL W P-5'-P-CCNC: 9 U/L (ref 5–45)
BACTERIA UR QL AUTO: ABNORMAL /HPF
BASOPHILS # BLD AUTO: 0.05 THOUSANDS/ΜL (ref 0–0.1)
BASOPHILS NFR BLD AUTO: 1 % (ref 0–1)
BILIRUB SERPL-MCNC: 0.34 MG/DL (ref 0.2–1)
BILIRUB UR QL STRIP: NEGATIVE
BUN SERPL-MCNC: 8 MG/DL (ref 5–25)
CALCIUM SERPL-MCNC: 9.1 MG/DL (ref 8.3–10.1)
CHLORIDE SERPL-SCNC: 104 MMOL/L (ref 100–108)
CLARITY UR: CLEAR
CO2 SERPL-SCNC: 25 MMOL/L (ref 21–32)
COLOR UR: YELLOW
CREAT SERPL-MCNC: 0.82 MG/DL (ref 0.6–1.3)
EOSINOPHIL # BLD AUTO: 0.11 THOUSAND/ΜL (ref 0–0.61)
EOSINOPHIL NFR BLD AUTO: 2 % (ref 0–6)
ERYTHROCYTE [DISTWIDTH] IN BLOOD BY AUTOMATED COUNT: 12.7 % (ref 11.6–15.1)
EXT PREG TEST URINE: NEGATIVE
GFR SERPL CREATININE-BSD FRML MDRD: 99 ML/MIN/1.73SQ M
GLUCOSE SERPL-MCNC: 85 MG/DL (ref 65–140)
GLUCOSE UR STRIP-MCNC: NEGATIVE MG/DL
HCT VFR BLD AUTO: 38.9 % (ref 34.8–46.1)
HGB BLD-MCNC: 13.1 G/DL (ref 11.5–15.4)
HGB UR QL STRIP.AUTO: NEGATIVE
INR PPP: 1.06 (ref 0.86–1.17)
KETONES UR STRIP-MCNC: NEGATIVE MG/DL
LEUKOCYTE ESTERASE UR QL STRIP: ABNORMAL
LIPASE SERPL-CCNC: 104 U/L (ref 73–393)
LYMPHOCYTES # BLD AUTO: 2.64 THOUSANDS/ΜL (ref 0.6–4.47)
LYMPHOCYTES NFR BLD AUTO: 41 % (ref 14–44)
MAGNESIUM SERPL-MCNC: 1.7 MG/DL (ref 1.6–2.6)
MCH RBC QN AUTO: 31.3 PG (ref 26.8–34.3)
MCHC RBC AUTO-ENTMCNC: 33.7 G/DL (ref 31.4–37.4)
MCV RBC AUTO: 93 FL (ref 82–98)
MONOCYTES # BLD AUTO: 0.51 THOUSAND/ΜL (ref 0.17–1.22)
MONOCYTES NFR BLD AUTO: 8 % (ref 4–12)
NEUTROPHILS # BLD AUTO: 3.11 THOUSANDS/ΜL (ref 1.85–7.62)
NEUTS SEG NFR BLD AUTO: 49 % (ref 43–75)
NITRITE UR QL STRIP: NEGATIVE
NON-SQ EPI CELLS URNS QL MICRO: ABNORMAL /HPF
NRBC BLD AUTO-RTO: 0 /100 WBCS
PH UR STRIP.AUTO: 5.5 [PH] (ref 4.5–8)
PLATELET # BLD AUTO: 235 THOUSANDS/UL (ref 149–390)
PMV BLD AUTO: 10 FL (ref 8.9–12.7)
POTASSIUM SERPL-SCNC: 3.6 MMOL/L (ref 3.5–5.3)
PROT SERPL-MCNC: 7.5 G/DL (ref 6.4–8.2)
PROT UR STRIP-MCNC: NEGATIVE MG/DL
PROTHROMBIN TIME: 13.9 SECONDS (ref 11.8–14.2)
RBC # BLD AUTO: 4.19 MILLION/UL (ref 3.81–5.12)
RBC #/AREA URNS AUTO: ABNORMAL /HPF
SODIUM SERPL-SCNC: 136 MMOL/L (ref 136–145)
SP GR UR STRIP.AUTO: 1.01 (ref 1–1.03)
UROBILINOGEN UR QL STRIP.AUTO: 0.2 E.U./DL
WBC # BLD AUTO: 6.42 THOUSAND/UL (ref 4.31–10.16)
WBC #/AREA URNS AUTO: ABNORMAL /HPF

## 2018-07-03 PROCEDURE — 83690 ASSAY OF LIPASE: CPT | Performed by: EMERGENCY MEDICINE

## 2018-07-03 PROCEDURE — 96374 THER/PROPH/DIAG INJ IV PUSH: CPT

## 2018-07-03 PROCEDURE — 80053 COMPREHEN METABOLIC PANEL: CPT | Performed by: EMERGENCY MEDICINE

## 2018-07-03 PROCEDURE — 83735 ASSAY OF MAGNESIUM: CPT | Performed by: EMERGENCY MEDICINE

## 2018-07-03 PROCEDURE — 81025 URINE PREGNANCY TEST: CPT | Performed by: EMERGENCY MEDICINE

## 2018-07-03 PROCEDURE — 85730 THROMBOPLASTIN TIME PARTIAL: CPT | Performed by: EMERGENCY MEDICINE

## 2018-07-03 PROCEDURE — 36415 COLL VENOUS BLD VENIPUNCTURE: CPT | Performed by: EMERGENCY MEDICINE

## 2018-07-03 PROCEDURE — 85610 PROTHROMBIN TIME: CPT | Performed by: EMERGENCY MEDICINE

## 2018-07-03 PROCEDURE — 85025 COMPLETE CBC W/AUTO DIFF WBC: CPT | Performed by: EMERGENCY MEDICINE

## 2018-07-03 PROCEDURE — 81001 URINALYSIS AUTO W/SCOPE: CPT

## 2018-07-03 PROCEDURE — 99284 EMERGENCY DEPT VISIT MOD MDM: CPT

## 2018-07-03 RX ORDER — ONDANSETRON 2 MG/ML
4 INJECTION INTRAMUSCULAR; INTRAVENOUS ONCE
Status: COMPLETED | OUTPATIENT
Start: 2018-07-03 | End: 2018-07-03

## 2018-07-03 RX ORDER — FAMOTIDINE 20 MG/1
20 TABLET, FILM COATED ORAL
Qty: 14 TABLET | Refills: 0 | Status: SHIPPED | OUTPATIENT
Start: 2018-07-03

## 2018-07-03 RX ORDER — ONDANSETRON 4 MG/1
4 TABLET, ORALLY DISINTEGRATING ORAL EVERY 8 HOURS PRN
Qty: 10 TABLET | Refills: 0 | Status: SHIPPED | OUTPATIENT
Start: 2018-07-03

## 2018-07-03 RX ORDER — OMEPRAZOLE 20 MG/1
20 CAPSULE, DELAYED RELEASE ORAL DAILY
Qty: 14 CAPSULE | Refills: 0 | Status: SHIPPED | OUTPATIENT
Start: 2018-07-03

## 2018-07-03 RX ADMIN — SODIUM CHLORIDE 1000 ML: 0.9 INJECTION, SOLUTION INTRAVENOUS at 13:08

## 2018-07-03 RX ADMIN — ONDANSETRON 4 MG: 2 INJECTION INTRAMUSCULAR; INTRAVENOUS at 13:08

## 2018-07-03 NOTE — DISCHARGE INSTRUCTIONS
Gastritis   WHAT YOU NEED TO KNOW:   Gastritis is inflammation or irritation of the lining of your stomach  DISCHARGE INSTRUCTIONS:   Call 911 for any of the following:   · You develop chest pain or shortness of breath  Return to the emergency department if:   · You vomit blood  · You have black or bloody bowel movements  · You have severe stomach or back pain  Contact your healthcare provider if:   · You have a fever  · You have new or worsening symptoms, even after treatment  · You have questions or concerns about your condition or care  Medicines:   · Medicines  may be given to help treat a bacterial infection or decrease stomach acid  · Take your medicine as directed  Contact your healthcare provider if you think your medicine is not helping or if you have side effects  Tell him or her if you are allergic to any medicine  Keep a list of the medicines, vitamins, and herbs you take  Include the amounts, and when and why you take them  Bring the list or the pill bottles to follow-up visits  Carry your medicine list with you in case of an emergency  Manage or prevent gastritis:   · Do not smoke  Nicotine and other chemicals in cigarettes and cigars can make your symptoms worse and cause lung damage  Ask your healthcare provider for information if you currently smoke and need help to quit  E-cigarettes or smokeless tobacco still contain nicotine  Talk to your healthcare provider before you use these products  · Do not drink alcohol  Alcohol can prevent healing and make your gastritis worse  Talk to your healthcare provider if you need help to stop drinking  · Do not take NSAIDs or aspirin unless directed  These and similar medicines can cause irritation  If your healthcare provider says it is okay to take NSAIDs, take them with food  · Do not eat foods that cause irritation  Foods such as oranges and salsa can cause burning or pain  Eat a variety of healthy foods   Examples include fruits (not citrus), vegetables, low-fat dairy products, beans, whole-grain breads, and lean meats and fish  Try to eat small meals, and drink water with your meals  Do not eat for at least 3 hours before you go to bed  · Find ways to relax and decrease stress  Stress can increase stomach acid and make gastritis worse  Activities such as yoga, meditation, or listening to music can help you relax  Spend time with friends, or do things you enjoy  Follow up with your healthcare provider as directed: You may need ongoing tests or treatment, or referral to a gastroenterologist  Write down your questions so you remember to ask them during your visits  © 2017 [facility] Information is for End User's use only and may not be sold, redistributed or otherwise used for commercial purposes  All illustrations and images included in CareNotes® are the copyrighted property of [employer]  or Mark Mckeon  The above information is an  only  It is not intended as medical advice for individual conditions or treatments  Talk to your doctor, nurse or pharmacist before following any medical regimen to see if it is safe and effective for you

## 2018-07-03 NOTE — ED PROVIDER NOTES
History  Chief Complaint   Patient presents with    Vomiting Blood     Started 3 weeks ago  History provided by:  Patient   used: No    Medical Problem - Major   Location:  Intermittent vomiting for the past 3 weeks with a sensation of fullness early satiety and occasionally when she vomits there is some specks of blood  No melena  No abdominal pain  Severity:  Moderate  Onset quality:  Gradual  Duration:  3 weeks  Timing:  Constant  Progression:  Waxing and waning  Chronicity:  New  Context:  Has taken a fair amount of NSAIDs  Patient has a lot of stress  No fevers or chills  No black tarry stools  Has never had anything like  Relieved by:  Nothing  Worsened by:  Nothing  Ineffective treatments:  None tried  Associated symptoms: nausea and vomiting    Associated symptoms: no abdominal pain, no chest pain, no cough, no diarrhea, no fever, no headaches and no shortness of breath        Prior to Admission Medications   Prescriptions Last Dose Informant Patient Reported? Taking? albuterol (PROVENTIL HFA,VENTOLIN HFA) 90 mcg/act inhaler   Yes Yes   Sig: Inhale 2 puffs every 4 (four) hours as needed      Facility-Administered Medications: None       Past Medical History:   Diagnosis Date    Asthma     Fibroids        Past Surgical History:   Procedure Laterality Date    DILATION AND EVACUATION N/A 10/27/2017    Procedure: DILATATION AND EVACUATION (D&E); Surgeon: Antoinette Larkin MD;  Location: [facility];  Service: Gynecology       History reviewed  No pertinent family history  I have reviewed and agree with the history as documented  Social History   Substance Use Topics    Smoking status: Former Smoker    Smokeless tobacco: Never Used    Alcohol use No        Review of Systems   Constitutional: Negative for chills and fever  Respiratory: Negative for cough, chest tightness and shortness of breath  Cardiovascular: Negative for chest pain     Gastrointestinal: Positive for nausea and vomiting  Negative for abdominal pain, blood in stool and diarrhea  Genitourinary: Negative for dysuria  Musculoskeletal: Negative for neck pain  Neurological: Negative for headaches  All other systems reviewed and are negative  Physical Exam  Physical Exam   Constitutional: She appears well-developed and well-nourished  She is cooperative  Non-toxic appearance  She does not have a sickly appearance  She does not appear ill  No distress  HENT:   Head: Normocephalic and atraumatic  Right Ear: Hearing normal  No drainage or swelling  Left Ear: Hearing normal  No drainage or swelling  Mouth/Throat: Mucous membranes are normal    Eyes: Conjunctivae and lids are normal  Right eye exhibits no discharge  Left eye exhibits no discharge  Neck: Trachea normal and normal range of motion  No JVD present  Cardiovascular: Normal rate, regular rhythm, normal heart sounds, intact distal pulses and normal pulses  Exam reveals no gallop and no friction rub  No murmur heard  Pulmonary/Chest: Effort normal and breath sounds normal  No stridor  No respiratory distress  She has no wheezes  She has no rales  Abdominal: Soft  Normal appearance  She exhibits no ascites and no mass  There is no hepatosplenomegaly  There is no tenderness  There is no rebound, no guarding and no CVA tenderness  Musculoskeletal: Normal range of motion  She exhibits no edema  Lymphadenopathy:        Right: No inguinal adenopathy present  Left: No inguinal adenopathy present  Neurological: She is alert  She has normal strength  She exhibits normal muscle tone  Gait normal  GCS eye subscore is 4  GCS verbal subscore is 5  GCS motor subscore is 6  Skin: Skin is warm, dry and intact  No rash noted  She is not diaphoretic  No pallor  Psychiatric: She has a normal mood and affect  Her speech is normal  Cognition and memory are normal    Nursing note and vitals reviewed        Vital Signs  ED Triage Vitals [07/03/18 1135]   Temperature Pulse Respirations Blood Pressure SpO2   98 2 °F (36 8 °C) 80 16 135/82 100 %      Temp Source Heart Rate Source Patient Position - Orthostatic VS BP Location FiO2 (%)   Oral -- Sitting Right arm --      Pain Score       3           Vitals:    07/03/18 1135   BP: 135/82   Pulse: 80   Patient Position - Orthostatic VS: Sitting       Visual Acuity      ED Medications  Medications   sodium chloride 0 9 % bolus 1,000 mL (0 mL Intravenous Stopped 7/3/18 1329)   ondansetron (ZOFRAN) injection 4 mg (4 mg Intravenous Given 7/3/18 1308)       Diagnostic Studies  Results Reviewed     Procedure Component Value Units Date/Time    Comprehensive metabolic panel [[id number]] Collected:  07/03/18 1225    Lab Status:  Final result Specimen:  Blood from Arm, Left Updated:  07/03/18 1249     Sodium 136 mmol/L      Potassium 3 6 mmol/L      Chloride 104 mmol/L      CO2 25 mmol/L      Anion Gap 7 mmol/L      BUN 8 mg/dL      Creatinine 0 82 mg/dL      Glucose 85 mg/dL      Calcium 9 1 mg/dL      AST 9 U/L      ALT 17 U/L      Alkaline Phosphatase 48 U/L      Total Protein 7 5 g/dL      Albumin 3 8 g/dL      Total Bilirubin 0 34 mg/dL      eGFR 99 ml/min/1 73sq m     Narrative:         National Kidney Disease Education Program recommendations are as follows:  GFR calculation is accurate only with a steady state creatinine  Chronic Kidney disease less than 60 ml/min/1 73 sq  meters  Kidney failure less than 15 ml/min/1 73 sq  meters      Lipase [[id number]]  (Normal) Collected:  07/03/18 1225    Lab Status:  Final result Specimen:  Blood from Arm, Left Updated:  07/03/18 1249     Lipase 104 u/L     Magnesium [[id number]]  (Normal) Collected:  07/03/18 1225    Lab Status:  Final result Specimen:  Blood from Arm, Left Updated:  07/03/18 1249     Magnesium 1 7 mg/dL     Protime-INR [[id number]]  (Normal) Collected:  07/03/18 1225    Lab Status:  Final result Specimen:  Blood from Arm, Left Updated:  07/03/18 1244 Protime 13 9 seconds      INR 1 06    APTT [[id number]]  (Normal) Collected:  07/03/18 1225    Lab Status:  Final result Specimen:  Blood from Arm, Left Updated:  07/03/18 1244     PTT 28 seconds     CBC and differential [[id number]] Collected:  07/03/18 1225    Lab Status:  Final result Specimen:  Blood from Arm, Left Updated:  07/03/18 1239     WBC 6 42 Thousand/uL      RBC 4 19 Million/uL      Hemoglobin 13 1 g/dL      Hematocrit 38 9 %      MCV 93 fL      MCH 31 3 pg      MCHC 33 7 g/dL      RDW 12 7 %      MPV 10 0 fL      Platelets 895 Thousands/uL      nRBC 0 /100 WBCs      Neutrophils Relative 49 %      Lymphocytes Relative 41 %      Monocytes Relative 8 %      Eosinophils Relative 2 %      Basophils Relative 1 %      Neutrophils Absolute 3 11 Thousands/µL      Lymphocytes Absolute 2 64 Thousands/µL      Monocytes Absolute 0 51 Thousand/µL      Eosinophils Absolute 0 11 Thousand/µL      Basophils Absolute 0 05 Thousands/µL     Urine Microscopic [[id number]]  (Abnormal) Collected:  07/03/18 1215    Lab Status:  Final result Specimen:  Urine from Urine, Clean Catch Updated:  07/03/18 1239     RBC, UA None Seen /hpf      WBC, UA 2-4 (A) /hpf      Epithelial Cells Moderate (A) /hpf      Bacteria, UA Occasional /hpf     POCT pregnancy, urine [[id number]]  (Normal) Resulted:  07/03/18 1214    Lab Status:  Final result Updated:  07/03/18 1214     EXT PREG TEST UR (Ref: Negative) Negative    POCT urinalysis dipstick [[id number]]  (Abnormal) Resulted:  07/03/18 1211    Lab Status:  Final result Specimen:  Urine Updated:  07/03/18 1211    ED Urine Macroscopic [[id number]]  (Abnormal) Collected:  07/03/18 1215    Lab Status:  Final result Specimen:  Urine Updated:  07/03/18 1210     Color, UA Yellow     Clarity, UA Clear     pH, UA 5 5     Leukocytes, UA Small (A)     Nitrite, UA Negative     Protein, UA Negative mg/dl      Glucose, UA Negative mg/dl      Ketones, UA Negative mg/dl      Urobilinogen, UA 0 2 E U /dl      Bilirubin, UA Negative     Blood, UA Negative     Specific Gravity, UA 1 015    Narrative:       [facility] RESULT                 No orders to display              Procedures  Procedures       Phone Contacts  ED Phone Contact    ED Course                               MDM  Number of Diagnoses or Management Options  Gastritis:   Diagnosis management comments: Hemoglobin is stable  No evidence of pancreatitis or liver enzyme abnormalities  Will treat with proton pump inhibitor and Pepcid and have her follow up with GI  Possible ulcer  Amount and/or Complexity of Data Reviewed  Clinical lab tests: reviewed and ordered    Patient Progress  Patient progress: stable    CritCare Time    Disposition  Final diagnoses:   Gastritis     Time reflects when diagnosis was documented in both MDM as applicable and the Disposition within this note     Time User Action Codes Description Comment    7/3/2018  1:04 PM Delphine Bui Add [K29 70] Gastritis       ED Disposition     ED Disposition Condition Comment    Discharge  Shelby Fulling discharge to home/self care      Condition at discharge: Good        Follow-up Information     Follow up With Specialties Details Why Tyrese Jesus  Call to get a primary care doctor [phone]      [facility] Schedule an appointment as soon as possible for a visit in 1 week  [place] [id number] [phone]          Discharge Medication List as of 7/3/2018  1:06 PM      START taking these medications    Details   famotidine (PEPCID) 20 mg tablet Take 1 tablet (20 mg total) by mouth daily at bedtime as needed for heartburn for up to 14 doses, Starting Tue 7/3/2018, Print      omeprazole (PriLOSEC) 20 mg delayed release capsule Take 1 capsule (20 mg total) by mouth daily, Starting Tue 7/3/2018, Print         CONTINUE these medications which have NOT CHANGED    Details   albuterol (PROVENTIL HFA,VENTOLIN HFA) 90 mcg/act inhaler Inhale 2 puffs every 4 (four) hours as needed, Historical Med           No discharge procedures on file      ED Provider  Electronically Signed by           Gina Murrell MD  07/03/18 0459

## (undated) DEVICE — D + E CONNECTION HOSE

## (undated) DEVICE — STRL UNIVERSAL MINOR VAGINAL: Brand: CARDINAL HEALTH

## (undated) DEVICE — PREMIUM DRY TRAY LF: Brand: MEDLINE INDUSTRIES, INC.

## (undated) DEVICE — PVC URETHRAL CATHETER: Brand: DOVER

## (undated) DEVICE — SCD SEQUENTIAL COMPRESSION COMFORT SLEEVE MEDIUM KNEE LENGTH: Brand: KENDALL SCD

## (undated) DEVICE — D + E SUCTION CANISTER

## (undated) DEVICE — GLOVE SRG BIOGEL 6.5

## (undated) DEVICE — GLOVE INDICATOR PI UNDERGLOVE SZ 6.5 BLUE

## (undated) DEVICE — EXIDINE 4 PCT

## (undated) DEVICE — COLLECTION SET, DISPOSABLE WITH HANDLE AND TAPERED FITTINGS TUBING, 6 FT (183 CM): Brand: GYRUS ACMI

## (undated) DEVICE — GLOVE SRG LF STRL BGL SKNSNS 7 PF

## (undated) DEVICE — PREP PAD BNS: Brand: CONVERTORS

## (undated) DEVICE — D + E SAFE TOUCH TISSUE TRAP (CIRCON)